# Patient Record
Sex: FEMALE | Race: OTHER | HISPANIC OR LATINO | Employment: FULL TIME | URBAN - METROPOLITAN AREA
[De-identification: names, ages, dates, MRNs, and addresses within clinical notes are randomized per-mention and may not be internally consistent; named-entity substitution may affect disease eponyms.]

---

## 2017-01-27 ENCOUNTER — ALLSCRIPTS OFFICE VISIT (OUTPATIENT)
Dept: OTHER | Facility: OTHER | Age: 37
End: 2017-01-27

## 2017-01-27 DIAGNOSIS — M54.2 CERVICALGIA: ICD-10-CM

## 2017-01-27 DIAGNOSIS — R51.9 HEADACHE: ICD-10-CM

## 2017-12-27 ENCOUNTER — ALLSCRIPTS OFFICE VISIT (OUTPATIENT)
Dept: OTHER | Facility: OTHER | Age: 37
End: 2017-12-27

## 2018-01-14 VITALS
DIASTOLIC BLOOD PRESSURE: 80 MMHG | OXYGEN SATURATION: 98 % | HEIGHT: 65 IN | WEIGHT: 164.38 LBS | HEART RATE: 85 BPM | TEMPERATURE: 98 F | BODY MASS INDEX: 27.39 KG/M2 | SYSTOLIC BLOOD PRESSURE: 140 MMHG | RESPIRATION RATE: 16 BRPM

## 2018-01-23 VITALS
DIASTOLIC BLOOD PRESSURE: 78 MMHG | TEMPERATURE: 98.4 F | OXYGEN SATURATION: 98 % | WEIGHT: 168 LBS | HEART RATE: 76 BPM | BODY MASS INDEX: 27.99 KG/M2 | SYSTOLIC BLOOD PRESSURE: 112 MMHG | HEIGHT: 65 IN

## 2018-01-23 NOTE — PROGRESS NOTES
Assessment    1  Encounter for preventive health examination (V70 0) (Z00 00)    Plan  Health Maintenance    · Decreasing the stress in your life may help your condition improve ; Status:Complete;    Done: [id number] 10:03AM   · Schedule an appointment in 48-72 hours to have your TB (tuberculin) skin test  interpreted by a trained healthcare provider ; Status:Complete;   Done: [id number]  10:03AM  Visit for TB skin test    · Tubersol 5 UNIT/0 1ML Intradermal Solution    Discussion/Summary  health maintenance visit healthy adult female Currently, she eats an adequate diet  The status unknown, pt will try to obtain record  1  health maintenance: Pts physical exam is WNL  A PPD was placed today and pt is to return on Friday for evaluation of site  Pt did not have paperwork to complete for todays exam and she is not certain if there is any paperwork  She states she will find out and notify the office next week  She declines a flu shot today and does not know if her Tdap is up to date  We did discuss the risks of being unvaccinated especially because 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  The patient is going to find out what is required for her job and will let the office know  The patient was counseled regarding impressions  Chief Complaint  Pt is here today for a physical (for work)  History of Present Illness  HM, Adult Female: The patient is being seen for a health maintenance evaluation  Social History: Household members include 1 daughter(s) and 1 son(s)  Work status: working full time  The patient has never smoked cigarettes  General Health: The patient's health since the last visit is described as good  Immunizations status:  unknown  Screening:   HPI: Pt is a 39 y/o female who is seen today for a work physical  PMH includes migraine headaches  She denies fever/chills, weight loss, HA, dizziness  No change in bowel/bladder   She states she does at times get anxiety related chest pain which has been evaluated in the ER and was found to be non-cardiac in nature  No palpitations or SOB  She denies pain, weakness, numbness/tingling  Review of Systems    Constitutional: No fever, no chills, feels well, no tiredness, no recent weight gain or weight loss  Eyes: no eye pain and no eyesight problems  ENT: no hearing loss  Cardiovascular: as noted in HPI, no chest pain, no palpitations and no lower extremity edema  Respiratory: no shortness of breath and no cough  Gastrointestinal: no abdominal pain, no nausea, no vomiting and no diarrhea    The patient presents with complaints of occasional episodes of constipation  Symptoms are improved by increased fluids  Symptoms are improving  Genitourinary: no dysuria  Musculoskeletal: no arthralgias and no myalgias  Neurological: no headache, no numbness, no tingling and no dizziness  Psychiatric: anxiety, but as noted in HPI  ROS reviewed  Active Problems    1  Acute rhinosinusitis (461 9) (J01 90)   2  Cervicalgia (723 1) (M54 2)   3  Headache (784 0) (R51)   4  Migraine headache (346 90) (G43 909)   5   Visit for TB skin test (V74 1) (Z11 1)    Past Medical History    · 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)   · Denied: History of Known health problems: none    Surgical History    · History of  Section   · Denied: History Of Prior Surgery   · History of Incisional Breast Biopsy    Family History  Mother    · Family history of diabetes mellitus (V18 0) (Z83 3)  Father    · Family history of diabetes mellitus (V18 0) (Z83 3)  Sister    · Family history of Hepatitis  Maternal Grandmother    · Family history of Throat cancer    Social History    · Denied: History of Alcohol use   · Denied: History of Current every day smoker   · Denied: History of Illicit drug use   ·    · Never a smoker   · Occupation   · Cash representative   · Rarely consumes alcohol (V49 89) (Z78 9)   · Denied: History of Tobacco use    Current Meds   1  Excedrin Extra Strength TABS; Therapy: (Recorded:04Apr2016) to Recorded   2  Ibuprofen 200 MG Oral Tablet; Therapy: (Recorded:04Apr2016) to Recorded    Allergies    1  No Known Drug Allergies    Vitals   Recorded: [id number] 09:12AM   Temperature 98 4 F   Heart Rate 76   Systolic 858   Diastolic 78   Height 5 ft 5 in   Weight 168 lb    BMI Calculated 27 96   BSA Calculated 1 84   O2 Saturation 98     Physical Exam    Constitutional   General appearance: No acute distress, well appearing and well nourished  Head and Face   Head and face: Normal     Eyes   Conjunctiva and lids: No swelling, erythema or discharge  Ears, Nose, Mouth, and Throat   External inspection of ears and nose: Normal     Otoscopic examination: Tympanic membranes translucent with normal light reflex  Canals patent without erythema  Nasal mucosa, septum, and turbinates: Normal without edema or erythema  Lips, teeth, and gums: Normal, good dentition  Oropharynx: Normal with no erythema, edema, exudate or lesions  Neck   Neck: Supple, symmetric, trachea midline, no masses  Thyroid: Normal, no thyromegaly  Pulmonary   Respiratory effort: No increased work of breathing or signs of respiratory distress  Auscultation of lungs: Clear to auscultation  Cardiovascular   Auscultation of heart: Normal rate and rhythm, normal S1 and S2, no murmurs  Carotid pulses: 2+ bilaterally  Pedal pulses: 2+ bilaterally  Examination of extremities for edema and/or varicosities: Normal     Abdomen   Abdomen: Non-tender, no masses  Liver and spleen: No hepatomegaly or splenomegaly  Lymphatic   Palpation of lymph nodes in neck: No lymphadenopathy  Musculoskeletal   Gait and station: Normal     Digits and nails: Normal without clubbing or cyanosis      Joints, bones, and muscles: Normal     Range of motion: Normal     Muscle strength/tone: Normal     Skin   Skin and subcutaneous tissue: Normal without rashes or lesions  Neurologic   Reflexes: Abnormal   Deep tendon reflexes: 1+ right patella  Sensation: No sensory loss  Psychiatric   Judgment and insight: Normal     Orientation to person, place, and time: Normal     Recent and remote memory: Intact  Mood and affect: Normal        Procedure    Procedure: Indication: routine screening  Inforrmation supplied by a Snellen chart  Results: 20/13 in both eyes without corrective device, 20/20 in the right eye without corrective device, 20/15 in the left eye without corrective device normal in both eyes        Signatures   Electronically signed by : Alexandro Hoang, ; Dec 27 2017 10:08AM EST                       (Author)    Electronically signed by : Marcin Hamilton DO; Dec 29 2017  8:41AM EST

## 2018-10-01 ENCOUNTER — TELEPHONE (OUTPATIENT)
Dept: INTERNAL MEDICINE CLINIC | Facility: CLINIC | Age: 38
End: 2018-10-01

## 2018-10-01 NOTE — TELEPHONE ENCOUNTER
Will she need me to sign off on paperwork for her work? If so, I would recommend getting a physical done, since I have not seen her since 1/2017

## 2018-10-01 NOTE — TELEPHONE ENCOUNTER
Called Pt to ask if pre-employment physical paperwork required, but she is unsure  She will call back to reschedule if needed   Pt noted that she has no insurance at the moment and may not get required lab work done at [place] [id number] until she figures out cost

## 2024-02-06 ENCOUNTER — TELEPHONE (OUTPATIENT)
Dept: OTHER | Facility: OTHER | Age: 44
End: 2024-02-06

## 2024-02-07 NOTE — TELEPHONE ENCOUNTER
Patient called requesting a callback from office at best convenience, to schedule a new patient appointment. Pt was referred by [facility]